# Patient Record
Sex: FEMALE | Race: WHITE | Employment: UNEMPLOYED | URBAN - NONMETROPOLITAN AREA
[De-identification: names, ages, dates, MRNs, and addresses within clinical notes are randomized per-mention and may not be internally consistent; named-entity substitution may affect disease eponyms.]

---

## 2021-06-07 RX ORDER — ALBUTEROL SULFATE 2.5 MG/3ML
2.5 SOLUTION RESPIRATORY (INHALATION) EVERY 6 HOURS PRN
COMMUNITY

## 2021-06-07 RX ORDER — TRIAMCINOLONE ACETONIDE 1 MG/G
CREAM TOPICAL 2 TIMES DAILY
COMMUNITY

## 2021-06-07 RX ORDER — BUDESONIDE AND FORMOTEROL FUMARATE DIHYDRATE 160; 4.5 UG/1; UG/1
1 AEROSOL RESPIRATORY (INHALATION) DAILY
COMMUNITY

## 2021-06-07 RX ORDER — CETIRIZINE HYDROCHLORIDE 10 MG/1
10 TABLET ORAL DAILY
COMMUNITY

## 2021-06-07 RX ORDER — CYCLOBENZAPRINE HCL 10 MG
10 TABLET ORAL 3 TIMES DAILY PRN
COMMUNITY

## 2021-06-07 RX ORDER — VALACYCLOVIR HYDROCHLORIDE 500 MG/1
500 TABLET, FILM COATED ORAL 2 TIMES DAILY
COMMUNITY

## 2021-06-07 NOTE — PROGRESS NOTES
NPO after midnight except sip of water with heart/BP meds  Follow all instructions given by surgeon including medications to hold  Bring insurance card and photo ID  Shower the night before or morning of procedure with liquid antibacterial soap  Wear comfortable clothing  Do not bring jewelry or valuables  Bring list of medications with dosage and how often taken if not reviewed   needed at discharge at least 25years old  Call PAT at [phone] for questions    Instructed to call surgery center at [phone] upon arrival to speak with  before entering building. [facility] before [facility]. Pt fully vaccinated and will not be tested.      Covid screening questionnaire complete and negative for symptoms or exposure see chart for documentation

## 2021-06-14 ENCOUNTER — ANESTHESIA EVENT (OUTPATIENT)
Dept: OPERATING ROOM | Age: 46
End: 2021-06-14
Payer: COMMERCIAL

## 2021-06-15 ENCOUNTER — HOSPITAL ENCOUNTER (OUTPATIENT)
Age: 46
Setting detail: OUTPATIENT SURGERY
Discharge: HOME | End: 2021-06-15
Attending: OBSTETRICS & GYNECOLOGY | Admitting: OBSTETRICS & GYNECOLOGY
Payer: COMMERCIAL

## 2021-06-15 ENCOUNTER — ANESTHESIA (OUTPATIENT)
Dept: OPERATING ROOM | Age: 46
End: 2021-06-15
Payer: COMMERCIAL

## 2021-06-15 VITALS
TEMPERATURE: 97.2 F | HEART RATE: 84 BPM | OXYGEN SATURATION: 97 % | BODY MASS INDEX: 37.18 KG/M2 | SYSTOLIC BLOOD PRESSURE: 136 MMHG | DIASTOLIC BLOOD PRESSURE: 75 MMHG | RESPIRATION RATE: 16 BRPM | WEIGHT: 189.4 LBS | HEIGHT: 60 IN

## 2021-06-15 VITALS
TEMPERATURE: 96.8 F | SYSTOLIC BLOOD PRESSURE: 116 MMHG | RESPIRATION RATE: 14 BRPM | OXYGEN SATURATION: 99 % | DIASTOLIC BLOOD PRESSURE: 59 MMHG

## 2021-06-15 DIAGNOSIS — G89.18 POST-OP PAIN: Primary | ICD-10-CM

## 2021-06-15 PROBLEM — D21.9 FIBROID: Status: ACTIVE | Noted: 2021-06-15

## 2021-06-15 PROBLEM — N92.1 MENOMETRORRHAGIA: Status: ACTIVE | Noted: 2021-06-15

## 2021-06-15 LAB
ABO: NORMAL
ANTIBODY SCREEN: NORMAL
RH FACTOR: NORMAL

## 2021-06-15 PROCEDURE — 3700000000 HC ANESTHESIA ATTENDED CARE: Performed by: OBSTETRICS & GYNECOLOGY

## 2021-06-15 PROCEDURE — 2709999900 HC NON-CHARGEABLE SUPPLY: Performed by: OBSTETRICS & GYNECOLOGY

## 2021-06-15 PROCEDURE — 3700000001 HC ADD 15 MINUTES (ANESTHESIA): Performed by: OBSTETRICS & GYNECOLOGY

## 2021-06-15 PROCEDURE — 2580000003 HC RX 258: Performed by: OBSTETRICS & GYNECOLOGY

## 2021-06-15 PROCEDURE — 86900 BLOOD TYPING SEROLOGIC ABO: CPT

## 2021-06-15 PROCEDURE — 3600000019 HC SURGERY ROBOT ADDTL 15MIN: Performed by: OBSTETRICS & GYNECOLOGY

## 2021-06-15 PROCEDURE — 7100000000 HC PACU RECOVERY - FIRST 15 MIN: Performed by: OBSTETRICS & GYNECOLOGY

## 2021-06-15 PROCEDURE — 2780000010 HC IMPLANT OTHER: Performed by: OBSTETRICS & GYNECOLOGY

## 2021-06-15 PROCEDURE — 6360000002 HC RX W HCPCS: Performed by: OBSTETRICS & GYNECOLOGY

## 2021-06-15 PROCEDURE — 6370000000 HC RX 637 (ALT 250 FOR IP): Performed by: OBSTETRICS & GYNECOLOGY

## 2021-06-15 PROCEDURE — 36415 COLL VENOUS BLD VENIPUNCTURE: CPT

## 2021-06-15 PROCEDURE — 2500000003 HC RX 250 WO HCPCS: Performed by: OBSTETRICS & GYNECOLOGY

## 2021-06-15 PROCEDURE — 86850 RBC ANTIBODY SCREEN: CPT

## 2021-06-15 PROCEDURE — 2500000003 HC RX 250 WO HCPCS: Performed by: NURSE ANESTHETIST, CERTIFIED REGISTERED

## 2021-06-15 PROCEDURE — 7100000001 HC PACU RECOVERY - ADDTL 15 MIN: Performed by: OBSTETRICS & GYNECOLOGY

## 2021-06-15 PROCEDURE — 6360000002 HC RX W HCPCS: Performed by: NURSE ANESTHETIST, CERTIFIED REGISTERED

## 2021-06-15 PROCEDURE — 7100000010 HC PHASE II RECOVERY - FIRST 15 MIN: Performed by: OBSTETRICS & GYNECOLOGY

## 2021-06-15 PROCEDURE — 6360000002 HC RX W HCPCS: Performed by: ANESTHESIOLOGY

## 2021-06-15 PROCEDURE — 6370000000 HC RX 637 (ALT 250 FOR IP)

## 2021-06-15 PROCEDURE — 3600000009 HC SURGERY ROBOT BASE: Performed by: OBSTETRICS & GYNECOLOGY

## 2021-06-15 PROCEDURE — S2900 ROBOTIC SURGICAL SYSTEM: HCPCS | Performed by: OBSTETRICS & GYNECOLOGY

## 2021-06-15 PROCEDURE — 7100000011 HC PHASE II RECOVERY - ADDTL 15 MIN: Performed by: OBSTETRICS & GYNECOLOGY

## 2021-06-15 PROCEDURE — 86901 BLOOD TYPING SEROLOGIC RH(D): CPT

## 2021-06-15 PROCEDURE — 88307 TISSUE EXAM BY PATHOLOGIST: CPT

## 2021-06-15 RX ORDER — FENTANYL CITRATE 50 UG/ML
50 INJECTION, SOLUTION INTRAMUSCULAR; INTRAVENOUS EVERY 5 MIN PRN
Status: DISCONTINUED | OUTPATIENT
Start: 2021-06-15 | End: 2021-06-15 | Stop reason: HOSPADM

## 2021-06-15 RX ORDER — IBUPROFEN 800 MG/1
800 TABLET ORAL EVERY 8 HOURS PRN
Qty: 40 TABLET | Refills: 0 | Status: SHIPPED | OUTPATIENT
Start: 2021-06-15

## 2021-06-15 RX ORDER — ONDANSETRON 2 MG/ML
INJECTION INTRAMUSCULAR; INTRAVENOUS PRN
Status: DISCONTINUED | OUTPATIENT
Start: 2021-06-15 | End: 2021-06-15 | Stop reason: SDUPTHER

## 2021-06-15 RX ORDER — BUPIVACAINE HYDROCHLORIDE 5 MG/ML
INJECTION, SOLUTION EPIDURAL; INTRACAUDAL PRN
Status: DISCONTINUED | OUTPATIENT
Start: 2021-06-15 | End: 2021-06-15 | Stop reason: ALTCHOICE

## 2021-06-15 RX ORDER — DEXAMETHASONE SODIUM PHOSPHATE 10 MG/ML
INJECTION, EMULSION INTRAMUSCULAR; INTRAVENOUS PRN
Status: DISCONTINUED | OUTPATIENT
Start: 2021-06-15 | End: 2021-06-15 | Stop reason: SDUPTHER

## 2021-06-15 RX ORDER — SODIUM CHLORIDE, SODIUM LACTATE, POTASSIUM CHLORIDE, CALCIUM CHLORIDE 600; 310; 30; 20 MG/100ML; MG/100ML; MG/100ML; MG/100ML
INJECTION, SOLUTION INTRAVENOUS CONTINUOUS
Status: DISCONTINUED | OUTPATIENT
Start: 2021-06-15 | End: 2021-06-15 | Stop reason: HOSPADM

## 2021-06-15 RX ORDER — HYDROMORPHONE HCL 110MG/55ML
PATIENT CONTROLLED ANALGESIA SYRINGE INTRAVENOUS PRN
Status: DISCONTINUED | OUTPATIENT
Start: 2021-06-15 | End: 2021-06-15 | Stop reason: SDUPTHER

## 2021-06-15 RX ORDER — SODIUM CHLORIDE 9 MG/ML
25 INJECTION, SOLUTION INTRAVENOUS PRN
Status: DISCONTINUED | OUTPATIENT
Start: 2021-06-15 | End: 2021-06-15 | Stop reason: HOSPADM

## 2021-06-15 RX ORDER — OXYCODONE HYDROCHLORIDE AND ACETAMINOPHEN 5; 325 MG/1; MG/1
1 TABLET ORAL EVERY 4 HOURS PRN
Status: DISCONTINUED | OUTPATIENT
Start: 2021-06-15 | End: 2021-06-15 | Stop reason: HOSPADM

## 2021-06-15 RX ORDER — MIDAZOLAM HYDROCHLORIDE 1 MG/ML
INJECTION INTRAMUSCULAR; INTRAVENOUS PRN
Status: DISCONTINUED | OUTPATIENT
Start: 2021-06-15 | End: 2021-06-15 | Stop reason: SDUPTHER

## 2021-06-15 RX ORDER — ONDANSETRON 2 MG/ML
4 INJECTION INTRAMUSCULAR; INTRAVENOUS EVERY 6 HOURS PRN
Status: DISCONTINUED | OUTPATIENT
Start: 2021-06-15 | End: 2021-06-15 | Stop reason: HOSPADM

## 2021-06-15 RX ORDER — ACETAMINOPHEN 325 MG/1
650 TABLET ORAL EVERY 4 HOURS PRN
Status: DISCONTINUED | OUTPATIENT
Start: 2021-06-15 | End: 2021-06-15 | Stop reason: HOSPADM

## 2021-06-15 RX ORDER — SODIUM CHLORIDE 0.9 % (FLUSH) 0.9 %
5-40 SYRINGE (ML) INJECTION EVERY 12 HOURS SCHEDULED
Status: DISCONTINUED | OUTPATIENT
Start: 2021-06-15 | End: 2021-06-15 | Stop reason: HOSPADM

## 2021-06-15 RX ORDER — OXYCODONE HYDROCHLORIDE AND ACETAMINOPHEN 5; 325 MG/1; MG/1
2 TABLET ORAL EVERY 6 HOURS PRN
Qty: 28 TABLET | Refills: 0 | Status: SHIPPED | OUTPATIENT
Start: 2021-06-15 | End: 2021-06-22

## 2021-06-15 RX ORDER — SODIUM CHLORIDE 0.9 % (FLUSH) 0.9 %
5-40 SYRINGE (ML) INJECTION PRN
Status: DISCONTINUED | OUTPATIENT
Start: 2021-06-15 | End: 2021-06-15 | Stop reason: HOSPADM

## 2021-06-15 RX ORDER — PROPOFOL 10 MG/ML
INJECTION, EMULSION INTRAVENOUS PRN
Status: DISCONTINUED | OUTPATIENT
Start: 2021-06-15 | End: 2021-06-15 | Stop reason: SDUPTHER

## 2021-06-15 RX ORDER — ONDANSETRON 4 MG/1
4 TABLET, ORALLY DISINTEGRATING ORAL EVERY 8 HOURS PRN
Status: DISCONTINUED | OUTPATIENT
Start: 2021-06-15 | End: 2021-06-15 | Stop reason: HOSPADM

## 2021-06-15 RX ORDER — FENTANYL CITRATE 50 UG/ML
25 INJECTION, SOLUTION INTRAMUSCULAR; INTRAVENOUS EVERY 5 MIN PRN
Status: DISCONTINUED | OUTPATIENT
Start: 2021-06-15 | End: 2021-06-15 | Stop reason: HOSPADM

## 2021-06-15 RX ORDER — OXYCODONE HYDROCHLORIDE AND ACETAMINOPHEN 5; 325 MG/1; MG/1
TABLET ORAL
Status: DISCONTINUED
Start: 2021-06-15 | End: 2021-06-15 | Stop reason: HOSPADM

## 2021-06-15 RX ORDER — LIDOCAINE HYDROCHLORIDE 20 MG/ML
INJECTION, SOLUTION EPIDURAL; INFILTRATION; INTRACAUDAL; PERINEURAL PRN
Status: DISCONTINUED | OUTPATIENT
Start: 2021-06-15 | End: 2021-06-15 | Stop reason: SDUPTHER

## 2021-06-15 RX ORDER — PROMETHAZINE HYDROCHLORIDE 25 MG/ML
12.5 INJECTION, SOLUTION INTRAMUSCULAR; INTRAVENOUS
Status: DISCONTINUED | OUTPATIENT
Start: 2021-06-15 | End: 2021-06-15 | Stop reason: HOSPADM

## 2021-06-15 RX ORDER — LABETALOL 20 MG/4 ML (5 MG/ML) INTRAVENOUS SYRINGE
10 EVERY 10 MIN PRN
Status: DISCONTINUED | OUTPATIENT
Start: 2021-06-15 | End: 2021-06-15 | Stop reason: HOSPADM

## 2021-06-15 RX ORDER — IBUPROFEN 800 MG/1
TABLET ORAL
Status: COMPLETED
Start: 2021-06-15 | End: 2021-06-15

## 2021-06-15 RX ORDER — FENTANYL CITRATE 50 UG/ML
INJECTION, SOLUTION INTRAMUSCULAR; INTRAVENOUS PRN
Status: DISCONTINUED | OUTPATIENT
Start: 2021-06-15 | End: 2021-06-15 | Stop reason: SDUPTHER

## 2021-06-15 RX ORDER — IBUPROFEN 800 MG/1
800 TABLET ORAL ONCE
Status: COMPLETED | OUTPATIENT
Start: 2021-06-15 | End: 2021-06-15

## 2021-06-15 RX ORDER — OXYCODONE HYDROCHLORIDE AND ACETAMINOPHEN 5; 325 MG/1; MG/1
2 TABLET ORAL EVERY 4 HOURS PRN
Status: DISCONTINUED | OUTPATIENT
Start: 2021-06-15 | End: 2021-06-15 | Stop reason: HOSPADM

## 2021-06-15 RX ORDER — ROCURONIUM BROMIDE 10 MG/ML
INJECTION, SOLUTION INTRAVENOUS PRN
Status: DISCONTINUED | OUTPATIENT
Start: 2021-06-15 | End: 2021-06-15 | Stop reason: SDUPTHER

## 2021-06-15 RX ADMIN — IBUPROFEN 800 MG: 800 TABLET ORAL at 12:30

## 2021-06-15 RX ADMIN — ONDANSETRON 4 MG: 2 INJECTION INTRAMUSCULAR; INTRAVENOUS at 12:08

## 2021-06-15 RX ADMIN — DEXAMETHASONE SODIUM PHOSPHATE 10 MG: 10 INJECTION, EMULSION INTRAMUSCULAR; INTRAVENOUS at 07:46

## 2021-06-15 RX ADMIN — ROCURONIUM BROMIDE 20 MG: 10 INJECTION INTRAVENOUS at 08:28

## 2021-06-15 RX ADMIN — SODIUM CHLORIDE, POTASSIUM CHLORIDE, SODIUM LACTATE AND CALCIUM CHLORIDE: 600; 310; 30; 20 INJECTION, SOLUTION INTRAVENOUS at 07:06

## 2021-06-15 RX ADMIN — ONDANSETRON HYDROCHLORIDE 4 MG: 4 INJECTION, SOLUTION INTRAMUSCULAR; INTRAVENOUS at 07:46

## 2021-06-15 RX ADMIN — FENTANYL CITRATE 50 MCG: 50 INJECTION, SOLUTION INTRAMUSCULAR; INTRAVENOUS at 08:28

## 2021-06-15 RX ADMIN — LIDOCAINE HYDROCHLORIDE 80 MG: 20 INJECTION, SOLUTION EPIDURAL; INFILTRATION; INTRACAUDAL; PERINEURAL at 07:40

## 2021-06-15 RX ADMIN — OXYCODONE HYDROCHLORIDE AND ACETAMINOPHEN 1 TABLET: 5; 325 TABLET ORAL at 10:01

## 2021-06-15 RX ADMIN — FENTANYL CITRATE 50 MCG: 50 INJECTION, SOLUTION INTRAMUSCULAR; INTRAVENOUS at 07:56

## 2021-06-15 RX ADMIN — ROCURONIUM BROMIDE 50 MG: 10 INJECTION INTRAVENOUS at 07:41

## 2021-06-15 RX ADMIN — FENTANYL CITRATE 50 MCG: 50 INJECTION, SOLUTION INTRAMUSCULAR; INTRAVENOUS at 07:40

## 2021-06-15 RX ADMIN — HYDROMORPHONE HYDROCHLORIDE 0.5 MG: 2 INJECTION INTRAMUSCULAR; INTRAVENOUS; SUBCUTANEOUS at 08:05

## 2021-06-15 RX ADMIN — PROPOFOL 150 MG: 10 INJECTION, EMULSION INTRAVENOUS at 07:40

## 2021-06-15 RX ADMIN — MIDAZOLAM 1 MG: 1 INJECTION INTRAMUSCULAR; INTRAVENOUS at 07:37

## 2021-06-15 RX ADMIN — FENTANYL CITRATE 25 MCG: 50 INJECTION, SOLUTION INTRAMUSCULAR; INTRAVENOUS at 09:42

## 2021-06-15 RX ADMIN — FENTANYL CITRATE 25 MCG: 50 INJECTION, SOLUTION INTRAMUSCULAR; INTRAVENOUS at 09:26

## 2021-06-15 RX ADMIN — CEFAZOLIN 2000 MG: 10 INJECTION, POWDER, FOR SOLUTION INTRAVENOUS at 07:45

## 2021-06-15 RX ADMIN — IBUPROFEN 800 MG: 800 TABLET, FILM COATED ORAL at 12:30

## 2021-06-15 RX ADMIN — SUGAMMADEX 200 MG: 100 INJECTION, SOLUTION INTRAVENOUS at 09:00

## 2021-06-15 RX ADMIN — FENTANYL CITRATE 25 MCG: 50 INJECTION, SOLUTION INTRAMUSCULAR; INTRAVENOUS at 09:18

## 2021-06-15 RX ADMIN — FENTANYL CITRATE 50 MCG: 50 INJECTION, SOLUTION INTRAMUSCULAR; INTRAVENOUS at 08:03

## 2021-06-15 ASSESSMENT — PAIN DESCRIPTION - FREQUENCY
FREQUENCY: CONTINUOUS
FREQUENCY: CONTINUOUS

## 2021-06-15 ASSESSMENT — PULMONARY FUNCTION TESTS
PIF_VALUE: 5
PIF_VALUE: 27
PIF_VALUE: 26
PIF_VALUE: 1
PIF_VALUE: 26
PIF_VALUE: 21
PIF_VALUE: 27
PIF_VALUE: 12
PIF_VALUE: 15
PIF_VALUE: 6
PIF_VALUE: 26
PIF_VALUE: 25
PIF_VALUE: 26
PIF_VALUE: 26
PIF_VALUE: 11
PIF_VALUE: 26
PIF_VALUE: 27
PIF_VALUE: 24
PIF_VALUE: 26
PIF_VALUE: 12
PIF_VALUE: 26
PIF_VALUE: 24
PIF_VALUE: 5
PIF_VALUE: 25
PIF_VALUE: 26
PIF_VALUE: 26
PIF_VALUE: 24
PIF_VALUE: 25
PIF_VALUE: 11
PIF_VALUE: 17
PIF_VALUE: 15
PIF_VALUE: 18
PIF_VALUE: 26
PIF_VALUE: 27
PIF_VALUE: 25
PIF_VALUE: 25
PIF_VALUE: 27
PIF_VALUE: 15
PIF_VALUE: 26
PIF_VALUE: 15
PIF_VALUE: 27
PIF_VALUE: 15
PIF_VALUE: 16
PIF_VALUE: 15
PIF_VALUE: 26
PIF_VALUE: 27
PIF_VALUE: 26
PIF_VALUE: 27
PIF_VALUE: 17
PIF_VALUE: 12
PIF_VALUE: 26
PIF_VALUE: 14
PIF_VALUE: 15
PIF_VALUE: 15
PIF_VALUE: 25
PIF_VALUE: 26
PIF_VALUE: 22
PIF_VALUE: 26
PIF_VALUE: 27
PIF_VALUE: 26
PIF_VALUE: 26
PIF_VALUE: 15
PIF_VALUE: 27
PIF_VALUE: 0
PIF_VALUE: 0
PIF_VALUE: 6
PIF_VALUE: 12
PIF_VALUE: 26
PIF_VALUE: 26
PIF_VALUE: 17
PIF_VALUE: 0
PIF_VALUE: 8
PIF_VALUE: 15
PIF_VALUE: 27
PIF_VALUE: 26
PIF_VALUE: 0
PIF_VALUE: 15
PIF_VALUE: 15
PIF_VALUE: 5
PIF_VALUE: 17
PIF_VALUE: 15
PIF_VALUE: 1
PIF_VALUE: 22
PIF_VALUE: 19
PIF_VALUE: 25
PIF_VALUE: 26
PIF_VALUE: 15

## 2021-06-15 ASSESSMENT — PAIN DESCRIPTION - ORIENTATION
ORIENTATION: RIGHT;LEFT

## 2021-06-15 ASSESSMENT — PAIN SCALES - GENERAL
PAINLEVEL_OUTOF10: 0
PAINLEVEL_OUTOF10: 3
PAINLEVEL_OUTOF10: 7
PAINLEVEL_OUTOF10: 4
PAINLEVEL_OUTOF10: 3

## 2021-06-15 ASSESSMENT — PAIN DESCRIPTION - PROGRESSION
CLINICAL_PROGRESSION: GRADUALLY IMPROVING
CLINICAL_PROGRESSION: GRADUALLY IMPROVING

## 2021-06-15 ASSESSMENT — PAIN DESCRIPTION - PAIN TYPE
TYPE: SURGICAL PAIN

## 2021-06-15 ASSESSMENT — PAIN DESCRIPTION - LOCATION
LOCATION: ABDOMEN

## 2021-06-15 ASSESSMENT — PAIN DESCRIPTION - DESCRIPTORS
DESCRIPTORS: CRAMPING
DESCRIPTORS: ACHING;CRAMPING

## 2021-06-15 NOTE — PROGRESS NOTES
1203: Patient ambulated to restroom with this RN at this time. Patient stated she felt nauseous after sitting on the toilet for a few minutes. 1208: 4mg Zofran given per IV. Patient denies urination, but wanting to sit for now. 1215: Patient stated she \"felt slightly better\" and was ready to go back to bed at this time. 1217: Patient ambulated with this RN back to room & placed in bed in stable condition. Call light within reach & bed in lowest position.

## 2021-06-15 NOTE — H&P
[address] [id number]                       PREOPERATIVE HISTORY AND PHYSICAL    PATIENT NAME: Phoebe Galvin                  :        1975  MED REC NO:   [id number]                           ROOM:  ACCOUNT NO:   [de-identified]                           ADMIT DATE: 06/15/2021  PROVIDER:     Aletha Mccallum M.D.    DATE OF SURGERY:  06/15/2021    HISTORY:  The patient is a 51-year-old multiparous female who presents  now with symptomatic menorrhagia and associated obesity. She has been  treated with oral contraceptives without good relief and she now  presents for robotic-assisted total laparoscopic hysterectomy, bilateral  salpingectomy, not oophorectomy. PAST MEDICAL HISTORY:  Major illnesses, remote history of cervical  dysplasia, asthma, migraine headaches. PRIOR SURGERIES:  Tubal ligation, Jada endometrial ablation,  hysteroscopy, D and C.    CURRENT MEDICATIONS:  Include albuterol, cetirizine, cyclobenzaprine,  ProAir, Symbicort, triamcinolone and valacyclovir. CURRENT ALLERGIES:  INCLUDE PENICLLINS AND LATEX EXAM GLOVES. FAMILY HISTORY:  Positive for type 1 and type 2 diabetes as well as  obesity. SOCIAL HISTORY:  She is . Denies use of illicit drugs or  excessive use of alcohol. She is a nonsmoker. REVIEW OF SYSTEMS:  Positive for obesity with a BMI of 39. PHYSICAL EXAMINATION:  GENERAL:  Well-developed, well-nourished obese female in no acute  distress. HEENT:  Normal.  LUNGS:  Clear. CARDIOVASCULAR:  Regular rate and rhythm without significant murmurs,  thrills, heaves or rubs. ABDOMEN:  Obese, soft without hepatosplenomegaly. PELVIC:  Negative for masses. EXTREMITIES:  Normal.  NEUROLOGIC:  Physiologic. IMPRESSION:  Menorrhagia post ablation that is failed medical therapy.     PLAN:  Robotic-assisted total laparoscopic hysterectomy, bilateral  salpingectomy, not oophorectomy. Osorio Fisher M.D.    D: 06/14/2021 11:48:33       T: 06/14/2021 11:53:20     [id number]  Job#: [id number]     Doc#: [id number]    CC:   Christy Bergeron M.D.

## 2021-06-15 NOTE — PROGRESS NOTES
1315: Took over care for Carmen Malik RN. Patient returned from bathroom and was unsuccessful. Returned to cart. Patient positioned onto left side. Cart in lowest position and call light given to patient.

## 2021-06-15 NOTE — PROGRESS NOTES
9329 To PACU from OR. Report received from [facility] and CRNA. Pt has noted simple mask on with O2 6L. Skin is warm and dry. Respirations are easy & non-labored. Pt is arousing to name. Leiva catheter noted with approximately 300ml clear yellow urine output noted. SCD's and warmer applied. Post op site x 4 (port sites) noted with steri-strips clean, dry and intact. Ice pack applied over site. Pt repositioned for comfort. 0915 VS WNL. Continue to monitor at bedside. 6577 Pt c/o discomfort and sensation of needing to have a bowel movement. Discussed pain control and fentanyl 25mcg slow IV push given for 4/10 discomfort. 0920 Pt sleeping quietly. VS WNL.    0926 Pt waking and continues to complain of discomfort surgical site 4/10. Fentanyl 25mcg slow IV push given. 0930 Sleeping quietly. VS WNL. 0695 Waking and c/o surgical site discomfort 4/10. Fentanyl 25mcg slow IV push given. LR 1000ml infused from OR and PACU. New bag hung at 125ml/hr per orders. 0090 Dr. Toni Hood called for orders. Pt waking and leiva catheter removed due to complaints of discomfort. 0920 Discussed pain control with patient (IV/oral). Pt agreeable to oral pain medication. Given water and apple sauce. Able to eat and drink without complications. Pt denies allergy to percocet. 1001 Pt given percocet 1 tab oral. Repositioned for comfort. 1010 VS WNL. Skin is warm and dry. Respirations are easy & non-labored. Resting quietly in bed. O2 discontinued and will monitor. [address] quietly in bed. VS WNL. SPO2 94% on room air. 1020 Moved to phase 2 recovery via cart. 1025 Pt repositioned for comfort. Pt denies needs at present. Reviewed plan of care with patient and spouse who voiced understanding. Call light in reach. Denies needs at present. 200 Dr. Toni Hood at bedside to see. 1045 Pt awake and states that she feels a need to void. To bathroom x 1 assist. Gait steady.     1055 Pt unable to void at this time. No vaginal bleeding noted. Steri-strips x 4 surgical site are clean dry and intact. Returned to room and positioned for comfort. Pt states pain is improving and is now 3/10.    1100 Pt HOB elevated and given muffin and water per request.  Call light in reach. Denies needs at present. 1107 Reviewed discharge instructions with patient and spouse. Voiced understanding. Pt continues to eat and drink without difficulty. Denies needs at present. 1136 Pt ate approximately 50% of muffin. Resting quietly in bed. Respirations are easy & non-labored. Denies needs at present. Call light in reach and  continues at bedside. 1200 Up to bathroom x 1 assist with Alvino Aguila. Pt unable to void and returned to bed. C/O nausea and order received for Zofran IV.    1208 Zofran IV given by KRYSTAL Silva RN.    0820 Pt resting quietly in room with . States nausea has improved but c/o back pain and cramping 7/10.    1225  Dr. Aaron Dodson updated and order received for Ibuprofen. 1230 Ibuprofen 800mg po given pain 7/10. Pt denies allergy to ibuprofen. Denies other needs at present. 1250 Pt up to bathroom x 1 assist. Gait steady.  Pt unable to void and returned to bed. Resting quietly with spouse by bedside. Denies needs at present and call light in reach. 1320 Report given to [place] [facility] who will assume pt care while this nurse is at lunch. 1350 This nurse reassumed pt care. Pt was able to void large amount without difficulty. States back and surgical site pain improved and now 3/10. Pt states she is ready for discharge home. 1400 VS reassessed and WNL. Skin is warm and dry. Respirations are easy & non-labored. A & O x 3. IV discontinued and bandaid applied. Dressing at bedside with spouse assistance. 1410 To private vehicle x 1 assist via wheelchair. Reviewed last doses of percocet and ibuprofen and when next doses are available for use. Voiced understanding.

## 2021-06-15 NOTE — ANESTHESIA PRE PROCEDURE
Melanie Flor MD        ceFAZolin (ANCEF) 2000 mg in dextrose 5 % 50 mL IVPB  2,000 mg Intravenous On Call to 4930 Janusz Rolon MD         Facility-Administered Medications Ordered in Other Encounters   Medication Dose Route Frequency Provider Last Rate Last Admin    midazolam (VERSED) injection   Intravenous PRN Pathak Fluke, APRN - CRNA   1 mg at 06/15/21 0737    fentaNYL (SUBLIMAZE) injection   Intravenous PRN Pathak Fluke, APRN - CRNA   50 mcg at 06/15/21 0740    lidocaine PF 2 % injection   Intravenous PRN Pathak Fluke, APRN - CRNA   80 mg at 06/15/21 0740    propofol injection   Intravenous PRN Pathak Fluke, APRN - CRNA   150 mg at 06/15/21 0740    rocuronium (ZEMURON) injection   Intravenous PRN Pathak Fluke, APRN - CRNA   50 mg at 06/15/21 0741    ondansetron (ZOFRAN) injection   Intravenous PRN Pathak Fluke, APRN - CRNA   4 mg at 06/15/21 0746       Allergies: Allergies   Allergen Reactions    Latex Itching    Pcn [Penicillins]        Problem List:  There is no problem list on file for this patient.       Past Medical History:        Diagnosis Date    Asthma     GERD (gastroesophageal reflux disease)     Menorrhagia        Past Surgical History:        Procedure Laterality Date    DILATION AND CURETTAGE OF UTERUS  1996 and 2020    ENDOMETRIAL ABLATION  2020    HYSTEROSCOPY  2020    TUBAL LIGATION  2020       Social History:    Social History     Tobacco Use    Smoking status: Never Smoker    Smokeless tobacco: Never Used   Substance Use Topics    Alcohol use: Yes     Comment: socially                                 Counseling given: Not Answered      Vital Signs (Current):   Vitals:    06/07/21 1434 06/15/21 0704   BP:  135/81   Pulse:  86   Resp:  16   Temp:  98.3 °F (36.8 °C)   TempSrc:  Temporal   SpO2:  98%   Weight: 191 lb (86.6 kg) 189 lb 6.4 oz (85.9 kg)   Height: 5' (1.524 m) 5' (1.524 m)                                              BP Readings from Last 3 Encounters:   06/15/21 135/81       NPO Status: Time of last liquid consumption: 2330                        Time of last solid consumption: 1900                        Date of last liquid consumption: 06/14/21                        Date of last solid food consumption: 06/14/21    BMI:   Wt Readings from Last 3 Encounters:   06/15/21 189 lb 6.4 oz (85.9 kg)     Body mass index is 36.99 kg/m². CBC: No results found for: WBC, RBC, HGB, HCT, MCV, RDW, PLT    CMP: No results found for: NA, K, CL, CO2, BUN, CREATININE, GFRAA, AGRATIO, LABGLOM, GLUCOSE, PROT, CALCIUM, BILITOT, ALKPHOS, AST, ALT    POC Tests: No results for input(s): POCGLU, POCNA, POCK, POCCL, POCBUN, POCHEMO, POCHCT in the last 72 hours. Coags: No results found for: PROTIME, INR, APTT    HCG (If Applicable): No results found for: PREGTESTUR, PREGSERUM, HCG, HCGQUANT     ABGs: No results found for: PHART, PO2ART, MPC0HJE, HLS7POQ, BEART, D2YTGQYV     Type & Screen (If Applicable):  No results found for: LABABO, LABRH    Drug/Infectious Status (If Applicable):  No results found for: HIV, HEPCAB    COVID-19 Screening (If Applicable): No results found for: COVID19        Anesthesia Evaluation  Patient summary reviewed  Airway: Mallampati: II  TM distance: >3 FB   Neck ROM: full  Mouth opening: > = 3 FB Dental:          Pulmonary:   (+) asthma:                            Cardiovascular:                      Neuro/Psych:               GI/Hepatic/Renal:             Endo/Other:                     Abdominal:           Vascular:                                        Anesthesia Plan      general     ASA 2       Induction: intravenous. MIPS: Postoperative opioids intended and Prophylactic antiemetics administered. Anesthetic plan and risks discussed with patient and spouse. Plan discussed with ROBERTO Watkins.  [address] [place] [address]   6/15/2021

## 2021-06-15 NOTE — PROGRESS NOTES
1186 Uterus & left fallopian tube removed & sent to lab.  0875 300ml clear yellow urine noted in leiva bag.

## 2021-06-15 NOTE — BRIEF OP NOTE
Gyn Service   Brief Operative Report      Pre-operative Diagnosis:  PELVIC PAIN, menorrhagia, fibroid uterus    Post-operative Diagnosis:  Same, small distal residual left fallopian tube    Procedure:  RATLH, LEFT SALPINGECTOMY    Surgeon:  Aj Jj     Anesthesia:  General endotrachial anesthesia    Estimated blood loss:  50 ml     Findings:  POST OP DX    Complications:  NONE      See dictated operative report for full details.

## 2021-06-17 NOTE — OP NOTE
[address] [place] [address]                                OPERATIVE REPORT    PATIENT NAME: Asha Nava                  :        1975  MED REC NO:   [id number]                           ROOM:  ACCOUNT NO:   [de-identified]                           ADMIT DATE: 06/15/2021  PROVIDER:     Laura Mcelroy M.D.    DATE OF PROCEDURE:  06/15/2021    PREOPERATIVE DIAGNOSES:  Pelvic pain, menometrorrhagia, uterine fibroid. POSTOPERATIVE DIAGNOSES:  Pelvic pain, menometrorrhagia, uterine  fibroid, small distal residual left fallopian tube. OPERATION PERFORMED:  Robotic-assisted total laparoscopic hysterectomy,  left distal salpingectomy. SURGEON:  Laura Mcelroy MD    ANESTHETIST:  CRNA. TYPE OF ANESTHESIA:  General endotracheal.    ESTIMATED BLOOD LOSS:  50 mL. COMPLICATIONS:  None. NARRATIVE SUMMARY:  The patient was taken to the operating room, placed  on the operating table and adequate level of general endotracheal  anesthetic was established. She was then placed in modified dorsal  lithotomy position with the Geronimo stirrups and the abdomen, perineum,  vagina all prepped and draped in usual manner. Exam under anesthesia  was unremarkable. Weighted speculum placed in vagina. Cervix was  visualized. The anterior lip was grasped with a single-tooth tenaculum. Uterus sounded, cervix dilated. The [facility] uterine manipulator with the  large forniceal cup was placed transcervically to the fundus. The  retention balloon was insufflated. The cup was secured tightly into the  vaginal fornices with the aid of the vaginal occluder. Under sterile  conditions, Sands catheter was placed and drained clear yellow urine. Attention was then directed to the abdomen where an incision was made to  lower margin of the umbilicus.   Using a direct insertion technique, an  8-mm trocar was introduced into the abdominal cavity without difficulty. Laparoscope was placed. Appropriate placement was confirmed and a  pneumoperitoneum was created. A 5-mm incision and trocar were  introduced in the right mid and then in the left mid quadrant, both  under direct visualization and an 8-mm trocar was introduced in the left  upper quadrant also under direct visualization. At this point, the  patient was placed in steep Trendelenburg. The robot was docked without  difficulty and I retired to the robotic console. With the hot brooke in  my right hand and the bipolar vessel sealing device in my left hand, the  abdomen and pelvis were inspected. Superficial bowel, omentum, upper quadrants of the abdomen and pelvic  organs all appeared laparoscopically normal with the exception of a left  fundal fibroid present on the uterus as well as a very small distal  segment of left fallopian tube on the left side. The right fallopian  tube had been completely fulgurated with a prior procedure. At this point, the adnexal structures were triply sealed and transected  away from the uterus. The round ligaments were sealed and transected. Anterior leaf of the broad ligament was divided and the bladder was  reflected inferiorly. Posterior leaf of the broad ligament was divided  skeletonizing the uterine vessels which were triply sealed and  transected down to and including the uterosacral ligaments. Posterior  colpotomy was performed, carried around circumferentially until the  uterus was completely amputated and it was able to be withdrawn from the  pelvis via the vagina without difficulty. Attention was then directed  to the small piece of residual left fallopian tube. It was retracted  anteriorly and medially. The mesosalpinx was identified, sealed and  transected, and the tubal segment was removed and sent with the uterus  for histologic evaluation.     Vaginal cuff was then closed in a triple layer fashion using a V-Loc  suture with final layer of closure being a 0 Vicryl suture of the pelvic  peritoneum over the vaginal cuff. Excellent hemostasis was noted at all  uterine pedicles. After copious irrigation, the pelvis was painted with  Jessica powder for additional hemostatic effect. At this point, the robot was undocked. The patient was again placed  supine and all laparoscopic instruments were removed. The  pneumoperitoneum was relieved. Skin incisions were then closed with 4-0  white Vicryl subcuticular suture and injected with 0.5% Marcaine. The  Sands was draining clear yellow urine at the end of the procedure. There was minimal vaginal bleeding. The patient was awakened,  extubated, and taken to Recovery in good condition. Vishal Richardson M.D.    D: 06/17/2021 2:44:15       T: 06/17/2021 2:51:57     [id number]  Job#: [id number]     Doc#: [id number]    CC:   Yuli Baum M.D.

## 2024-04-02 ENCOUNTER — NURSE ONLY (OUTPATIENT)
Dept: LAB | Age: 49
End: 2024-04-02

## 2024-04-15 LAB — CYTOLOGY THIN PREP PAP: NORMAL

## 2025-06-03 ENCOUNTER — TRANSCRIBE ORDERS (OUTPATIENT)
Dept: ADMINISTRATIVE | Age: 50
End: 2025-06-03

## 2025-06-03 DIAGNOSIS — Z12.31 ENCOUNTER FOR SCREENING MAMMOGRAM FOR MALIGNANT NEOPLASM OF BREAST: Primary | ICD-10-CM

## (undated) DEVICE — APPLICATOR SURG XL L38CM FOR ARISTA ABSRB HEMSTAT FLEXITIP

## (undated) DEVICE — TIP COVER ACCESSORY

## (undated) DEVICE — TRI-LUMEN FILTERED TUBE SET WITH ACTIVATED CHARCOAL FILTER: Brand: AIRSEAL

## (undated) DEVICE — PACK PROCEDURE SURG GYN ROBOTIC

## (undated) DEVICE — ELECTRO LUBE IS A SINGLE PATIENT USE DEVICE THAT IS INTENDED TO BE USED ON ELECTROSURGICAL ELECTRODES TO REDUCE STICKING.: Brand: KEY SURGICAL ELECTRO LUBE

## (undated) DEVICE — ARM DRAPE

## (undated) DEVICE — OBTURATOR ROBOTIC DIA8MM BLDELSS ENDOSCP DISP DA VINCI SI

## (undated) DEVICE — SUTURE ABSRB L12IN L12MM SZ 2-0 GS-22 VLT GLYCOLIDE VLOCM2115

## (undated) DEVICE — STRIP,CLOSURE,WOUND,MEDI-STRIP,1/2X4: Brand: MEDLINE

## (undated) DEVICE — AGENT HEMSTAT 3GM PURIFIED PLNT STARCH PWD ABSRB ARISTA AH

## (undated) DEVICE — BANDAGE ADH W1XL3IN NAT FAB WVN FLX DURABLE N ADH PD SEAL

## (undated) DEVICE — GLOVE ORANGE PI 8   MSG9080

## (undated) DEVICE — AIRSEAL 12 MM ACCESS PORT AND PALM GRIP OBTURATOR WITH BLADELESS OPTICAL TIP, 120 MM LENGTH: Brand: AIRSEAL

## (undated) DEVICE — SUTURE VCRL SZ 4-0 L27IN ABSRB UD L19MM FS-2 3/8 CIR REV J422H

## (undated) DEVICE — CANNULA SEAL

## (undated) DEVICE — GLOVE ORANGE PI 7 1/2   MSG9075